# Patient Record
Sex: MALE | Race: ASIAN | NOT HISPANIC OR LATINO
[De-identification: names, ages, dates, MRNs, and addresses within clinical notes are randomized per-mention and may not be internally consistent; named-entity substitution may affect disease eponyms.]

---

## 2019-08-08 ENCOUNTER — APPOINTMENT (OUTPATIENT)
Dept: NEUROLOGY | Facility: CLINIC | Age: 54
End: 2019-08-08
Payer: COMMERCIAL

## 2019-08-08 VITALS
WEIGHT: 165 LBS | HEIGHT: 67.5 IN | HEART RATE: 71 BPM | BODY MASS INDEX: 25.59 KG/M2 | OXYGEN SATURATION: 98 % | SYSTOLIC BLOOD PRESSURE: 126 MMHG | DIASTOLIC BLOOD PRESSURE: 83 MMHG | TEMPERATURE: 98 F

## 2019-08-08 PROCEDURE — 99204 OFFICE O/P NEW MOD 45 MIN: CPT

## 2019-08-08 NOTE — CONSULT LETTER
[Dear  ___] : Dear  [unfilled], [Consult Letter:] : I had the pleasure of evaluating your patient, [unfilled]. [Please see my note below.] : Please see my note below. [Sincerely,] : Sincerely, [Consult Closing:] : Thank you very much for allowing me to participate in the care of this patient.  If you have any questions, please do not hesitate to contact me. [FreeTextEntry3] : Cortez Su M.D.\par Neurology, Electromyography and Neuromuscular Medicine\par [facility]\par \par  of Neurology\par [facility]

## 2019-08-08 NOTE — ASSESSMENT
[FreeTextEntry1] : Likely trigeminal neuralgia, less likely SUNCT\par Discussed various treatment options, currently wishes to defer\par If pain gets worse would try trileptal or gabapentin\par Imaging unremarkable (MRI brain from 2017 reviewed independently) although not clear if there is vascular loop contacting left trigeminal nerve - will get reports from [facility] \par Return as needed for new or worsening symptoms

## 2019-08-08 NOTE — PHYSICAL EXAM
[FreeTextEntry1] : Gen: appears well, well-nourished, no acute distress\par \par MS: awake, alert, speech fluent, comprehension intact, follows commands\par \par CN: PERRL, EOMI, visual fields full, facial strength and sensation intact and symmetric, palate elevation symmetric, tongue midline, no tongue atrophy or fasciculations\par \par Motor: normal bulk and tone, 5/5 strength throughout, no abnormal movements\par \par Sensory: light touch intact and symmetric throughout\par \par Reflexes: 2+ symmetric throughout, no Jama’s sign, plantar responses flexor bilaterally\par \par Coordination: no dysmetria on finger to nose, Romberg negative\par \par Gait: normal

## 2019-08-08 NOTE — HISTORY OF PRESENT ILLNESS
[FreeTextEntry1] : Referred by Dr. Quintanilla for left facial pain\par Symptoms started in 2004 when he had temporary crown put in, went away after permanent crown was placed. Then in 2016 it started happening again, described as a "shock-like" pain in the left side of the face, from the corner of the mouth up to behind the left eye, 10/10 in severity. Symptoms worse when cold or hot water hits that area of the face in the shower, also sometimes triggered by the first bite of food (especially if hard texture). There are periods when it bothers him every day for a few weeks, then gets better for a while, then comes back. \par He also feels his eyes watering when the pain occurs, left more than right. No nasal discharge. \par He was started on carbamazepine and baclofen, made him feel "loopy", but sure it they helped because symptoms had started to subside before he started them. \par \par 10 pt review of systems performed and reviewed with patient\par Past medical history, surgical history, social history, and family history reviewed with patient\par See scanned document for details

## 2019-09-06 ENCOUNTER — APPOINTMENT (OUTPATIENT)
Dept: PULMONOLOGY | Facility: CLINIC | Age: 54
End: 2019-09-06
Payer: COMMERCIAL

## 2019-09-06 VITALS
DIASTOLIC BLOOD PRESSURE: 70 MMHG | SYSTOLIC BLOOD PRESSURE: 110 MMHG | TEMPERATURE: 98.2 F | OXYGEN SATURATION: 96 % | HEART RATE: 88 BPM | HEIGHT: 67.5 IN | BODY MASS INDEX: 26.06 KG/M2 | WEIGHT: 168 LBS

## 2019-09-06 DIAGNOSIS — Z82.5 FAMILY HISTORY OF ASTHMA AND OTHER CHRONIC LOWER RESPIRATORY DISEASES: ICD-10-CM

## 2019-09-06 DIAGNOSIS — Z82.49 FAMILY HISTORY OF ISCHEMIC HEART DISEASE AND OTHER DISEASES OF THE CIRCULATORY SYSTEM: ICD-10-CM

## 2019-09-06 PROCEDURE — 94727 GAS DIL/WSHOT DETER LNG VOL: CPT

## 2019-09-06 PROCEDURE — 94729 DIFFUSING CAPACITY: CPT

## 2019-09-06 PROCEDURE — 99204 OFFICE O/P NEW MOD 45 MIN: CPT | Mod: 25

## 2019-09-06 PROCEDURE — 95012 NITRIC OXIDE EXP GAS DETER: CPT

## 2019-09-06 PROCEDURE — 94060 EVALUATION OF WHEEZING: CPT

## 2019-09-06 NOTE — ASSESSMENT
[FreeTextEntry1] : Data reviewed:\par \par PA/lat CXR [facility] 7/2019 personally reviewed: read as clear, to me inc markings\par \par PFT 09/06/2019 : mild-mod restriction with no sig BD response, normal DLCO / FENO 38\par \par Impression:\par Asthma by history\par \par Plan:\par His evaluation today is not diagnostic of asthma, on Advair 500/50. The history is suggestive of asthma, with a lifelong history and improvement on consistent Advair. I will leave him on Advair 500/50 for another couple of months and then re-evaluate him for stepdown.\par He had lots of questions. I counseled him about diet (plant-based) and exercise (do it).\par He is also very concerned about his cardiac health given family history of early CAD in father and brother. I recommended that he see a cardiologist for his own risk assessment.

## 2019-09-06 NOTE — HISTORY OF PRESENT ILLNESS
[FreeTextEntry1] : 09/06/2019: Asked to evaluate patient by Dr Quintanilla for asthma. He was born with asthma. He wakes every day with a drowning sensation and requires rescue inhaler first thing in the morning and about 6 hours later. He had been using Advair just sporadically, but Dr Quintanilla advised him to use it more regularly. Now he's on Advair 500 bid faithfully for about a month. On this he breathes much better and hasn't needed his rescue inhaler in 3 days. One unscheduled visit in past couple of years. No steroids in past year. Does have allergies. 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 [facility] but [facility] since infancy.\par

## 2019-09-06 NOTE — CONSULT LETTER
[Dear  ___] : Dear  [unfilled], [( Thank you for referring [unfilled] for consultation for _____ )] : Thank you for referring [unfilled] for consultation for [unfilled] [Please see my note below.] : Please see my note below. [Consult Closing:] : Thank you very much for allowing me to participate in the care of this patient.  If you have any questions, please do not hesitate to contact me. [Sincerely,] : Sincerely, [FreeTextEntry2] : Leroy Quintanilla, DO\par [address] \[place] [address] [FreeTextEntry3] : Fela Martinez MD, FCCP\par

## 2019-09-06 NOTE — PHYSICAL EXAM
[General Appearance - Well Nourished] : well nourished [General Appearance - Well Developed] : well developed [Normal Conjunctiva] : the conjunctiva exhibited no abnormalities [General Appearance - In No Acute Distress] : no acute distress [FreeTextEntry1] : no LAD [Normal Oropharynx] : normal oropharynx [Murmurs] : no murmurs present [Heart Rate And Rhythm] : heart rate and rhythm were normal [Heart Sounds] : normal S1 and S2 [Edema] : no peripheral edema present [Auscultation Breath Sounds / Voice Sounds] : lungs were clear to auscultation bilaterally [Abdomen Soft] : soft [Bowel Sounds] : normal bowel sounds [Nail Clubbing] : no clubbing of the fingernails [Abdomen Tenderness] : non-tender [Cyanosis, Localized] : no localized cyanosis [] : no rash [Affect] : the affect was normal

## 2019-11-06 ENCOUNTER — APPOINTMENT (OUTPATIENT)
Dept: PULMONOLOGY | Facility: CLINIC | Age: 54
End: 2019-11-06
Payer: COMMERCIAL

## 2019-11-06 VITALS
BODY MASS INDEX: 26.22 KG/M2 | HEART RATE: 67 BPM | HEIGHT: 67.5 IN | OXYGEN SATURATION: 98 % | DIASTOLIC BLOOD PRESSURE: 80 MMHG | TEMPERATURE: 97.5 F | WEIGHT: 169 LBS | SYSTOLIC BLOOD PRESSURE: 104 MMHG

## 2019-11-06 PROCEDURE — 94010 BREATHING CAPACITY TEST: CPT

## 2019-11-06 PROCEDURE — G0008: CPT

## 2019-11-06 PROCEDURE — 99213 OFFICE O/P EST LOW 20 MIN: CPT | Mod: 25

## 2019-11-06 PROCEDURE — 90686 IIV4 VACC NO PRSV 0.5 ML IM: CPT

## 2019-11-06 NOTE — HISTORY OF PRESENT ILLNESS
[FreeTextEntry1] : 09/06/2019: Asked to evaluate patient by Dr Quintanilla for asthma. He was born with asthma. He wakes every day with a drowning sensation and requires rescue inhaler first thing in the morning and about 6 hours later. He had been using Advair just sporadically, but Dr Quintanilla advised him to use it more regularly. Now he's on Advair 500 bid faithfully for about a month. On this he breathes much better and hasn't needed his rescue inhaler in 3 days. One unscheduled visit in past couple of years. No steroids in past year. Does have allergies. 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 Born [facility] but [facility] since infancy.\par \par 11/6/19: Dramatically improved on regular Advair, but when he misses a dose (rare) he does get that drowning feeling. He can feel when it's wearing off. From August to now only 1-2 uses of albuterol and even in these instances, they are less severe. Cold air triggers. No exacerbations.\par

## 2019-11-06 NOTE — ASSESSMENT
[FreeTextEntry1] : Data reviewed:\par \par PA/lat CXR [facility] 7/2019: read as clear, to me inc markings\par \par PFT 09/06/2019 : mild-mod restriction with no sig BD response, normal DLCO / FENO 38\par [place] 11/6/19: restricted, FEV1 65%\par \par Impression:\par Asthma by history\par \par Plan:\par Trial of stepdown to Advair 250/50. \par Should call and let me know if he notices any difference.\par Flu vaccine given.\par [id number] next visit.

## 2019-11-06 NOTE — CONSULT LETTER
[Dear  ___] : Dear  [unfilled], [Courtesy Letter:] : I had the pleasure of seeing your patient, [unfilled], in my office today. [Please see my note below.] : Please see my note below. [Consult Closing:] : Thank you very much for allowing me to participate in the care of this patient.  If you have any questions, please do not hesitate to contact me. [Sincerely,] : Sincerely, [FreeTextEntry2] : Leroy Quintanilla, DO\par [address] \[place] [address] [FreeTextEntry3] : Fela Martinez MD, FCCP\par

## 2020-01-08 ENCOUNTER — APPOINTMENT (OUTPATIENT)
Dept: PULMONOLOGY | Facility: CLINIC | Age: 55
End: 2020-01-08
Payer: COMMERCIAL

## 2020-01-08 VITALS
HEIGHT: 67 IN | DIASTOLIC BLOOD PRESSURE: 74 MMHG | SYSTOLIC BLOOD PRESSURE: 104 MMHG | TEMPERATURE: 97.9 F | HEART RATE: 81 BPM | WEIGHT: 170 LBS | OXYGEN SATURATION: 98 % | BODY MASS INDEX: 26.68 KG/M2

## 2020-01-08 PROCEDURE — 99213 OFFICE O/P EST LOW 20 MIN: CPT | Mod: 25

## 2020-01-08 PROCEDURE — 94010 BREATHING CAPACITY TEST: CPT

## 2020-01-08 RX ORDER — FLUTICASONE PROPIONATE AND SALMETEROL 50; 500 UG/1; UG/1
500-50 POWDER RESPIRATORY (INHALATION)
Refills: 0 | Status: DISCONTINUED | COMMUNITY
End: 2020-01-08

## 2020-01-08 NOTE — HISTORY OF PRESENT ILLNESS
[FreeTextEntry1] : 09/06/2019: Asked to evaluate patient by Dr Quintanilla for asthma. He was born with asthma. He wakes every day with a drowning sensation and requires rescue inhaler first thing in the morning and about 6 hours later. He had been using Advair just sporadically, but Dr Quintanilla advised him to use it more regularly. Now he's on Advair 500 bid faithfully for about a month. On this he breathes much better and hasn't needed his rescue inhaler in 3 days. One unscheduled visit in past couple of years. No steroids in past year. Does have allergies. 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 Born [facility] but [facility] since infancy.\par \par 11/6/19: Dramatically improved on regular Advair, but when he misses a dose (rare) he does get that drowning feeling. He can feel when it's wearing off. From August to now only 1-2 uses of albuterol and even in these instances, they are less severe. Cold air triggers. No exacerbations.\par \par 1/8/20: Has done well on Advair 250/50 - no change in his symptoms. He has had a recent cold with rhinorrhea, little sore throat, chest congestion and sputum production, better today. Until this happened he did not need any albuterol, but in the setting of this cold he did need it.\par

## 2020-01-08 NOTE — PHYSICAL EXAM
[General Appearance - Well Developed] : well developed [General Appearance - Well Nourished] : well nourished [General Appearance - In No Acute Distress] : no acute distress [Heart Rate And Rhythm] : heart rate and rhythm were normal [Normal Oropharynx] : normal oropharynx [Heart Sounds] : normal S1 and S2 [Murmurs] : no murmurs present [Auscultation Breath Sounds / Voice Sounds] : lungs were clear to auscultation bilaterally

## 2020-01-08 NOTE — ASSESSMENT
[FreeTextEntry1] : Data reviewed:\par \par PA/lat CXR L[facility] 7/2019: read as clear, to me inc markings\par \par PFT 09/06/2019 : mild-mod restriction with no sig BD response, normal DLCO / FENO 38\par [place] 11/6/19: restricted, FEV1 65%\par [place] 1/8/20: restricted, FEV1 63%\par \par Impression:\par Asthma by history\par \par Plan:\par Cont Advair 250/50. \par Can see me in spring for consideration of further stepdown, but maintain for winter.\par Declined pneumovax today.

## 2020-07-13 ENCOUNTER — APPOINTMENT (OUTPATIENT)
Dept: NEUROLOGY | Facility: CLINIC | Age: 55
End: 2020-07-13
Payer: COMMERCIAL

## 2020-07-13 VITALS
BODY MASS INDEX: 27.15 KG/M2 | OXYGEN SATURATION: 98 % | HEART RATE: 78 BPM | SYSTOLIC BLOOD PRESSURE: 128 MMHG | HEIGHT: 67 IN | WEIGHT: 173 LBS | DIASTOLIC BLOOD PRESSURE: 83 MMHG | TEMPERATURE: 96.26 F

## 2020-07-13 PROCEDURE — 99214 OFFICE O/P EST MOD 30 MIN: CPT

## 2020-07-13 NOTE — PHYSICAL EXAM
[FreeTextEntry1] : Gen: appears well, well-nourished, no acute distress\par \par MS: awake, alert, speech fluent, comprehension intact, follows commands\par \par CN: PERRL, EOMI, visual fields full, facial strength and sensation intact and symmetric, palate elevation symmetric, tongue midline, no tongue atrophy or fasciculations\par \par Motor: normal bulk and tone, 5/5 strength throughout, no abnormal movements\par \par Sensory: light touch intact and symmetric throughout\par \par Coordination: no dysmetria on finger to nose\par \par Gait: normal

## 2020-07-13 NOTE — HISTORY OF PRESENT ILLNESS
[FreeTextEntry1] : Last seen August 2019\par At that time he was doing well, not much pain\par However in the past few weeks he has had another attack of severe left facial pain, worse with chewing / eating, cold or hot liquids\par He saw an endodontist and oral surgeon who think there might be a wisdom / molar tooth potentially irritating a nerve but they are not sure if surgically removing it

## 2020-07-13 NOTE — ASSESSMENT
[FreeTextEntry1] : Restart carbamazepine 100mg ER BID, may increase to 200mg BID after 1-2 weeks if ineffective\par Will obtain report and upload images of prior MRI brain in 2017 to see if there is a vascular loop that is present in majority of people with trigeminal neuralgia\par If no vascular loop and no improvement with meds consider removal of molar tooth at that time\par

## 2020-07-21 ENCOUNTER — TRANSCRIPTION ENCOUNTER (OUTPATIENT)
Age: 55
End: 2020-07-21

## 2020-08-17 ENCOUNTER — APPOINTMENT (OUTPATIENT)
Dept: NEUROLOGY | Facility: CLINIC | Age: 55
End: 2020-08-17

## 2020-08-18 ENCOUNTER — APPOINTMENT (OUTPATIENT)
Dept: NEUROLOGY | Facility: CLINIC | Age: 55
End: 2020-08-18
Payer: COMMERCIAL

## 2020-08-18 VITALS
HEIGHT: 67 IN | WEIGHT: 176 LBS | OXYGEN SATURATION: 99 % | BODY MASS INDEX: 27.62 KG/M2 | TEMPERATURE: 208.04 F | DIASTOLIC BLOOD PRESSURE: 76 MMHG | HEART RATE: 83 BPM | SYSTOLIC BLOOD PRESSURE: 130 MMHG

## 2020-08-18 DIAGNOSIS — Z00.00 ENCOUNTER FOR GENERAL ADULT MEDICAL EXAMINATION W/OUT ABNORMAL FINDINGS: ICD-10-CM

## 2020-08-18 PROCEDURE — 99214 OFFICE O/P EST MOD 30 MIN: CPT

## 2020-08-24 ENCOUNTER — TRANSCRIPTION ENCOUNTER (OUTPATIENT)
Age: 55
End: 2020-08-24

## 2020-08-24 PROBLEM — Z00.00 ENCOUNTER FOR PREVENTIVE HEALTH EXAMINATION: Status: ACTIVE | Noted: 2019-07-09

## 2020-08-24 LAB
ALBUMIN SERPL ELPH-MCNC: 5 G/DL
ALP BLD-CCNC: 76 U/L
ALT SERPL-CCNC: 25 U/L
ANION GAP SERPL CALC-SCNC: 12 MMOL/L
AST SERPL-CCNC: 19 U/L
BASOPHILS # BLD AUTO: 0.04 K/UL
BASOPHILS NFR BLD AUTO: 0.5 %
BILIRUB SERPL-MCNC: 0.2 MG/DL
BUN SERPL-MCNC: 13 MG/DL
CALCIUM SERPL-MCNC: 9.5 MG/DL
CHLORIDE SERPL-SCNC: 103 MMOL/L
CO2 SERPL-SCNC: 26 MMOL/L
CREAT SERPL-MCNC: 0.85 MG/DL
EOSINOPHIL # BLD AUTO: 0.35 K/UL
EOSINOPHIL NFR BLD AUTO: 4.5 %
GLUCOSE SERPL-MCNC: 102 MG/DL
HCT VFR BLD CALC: 45.9 %
HGB BLD-MCNC: 14.2 G/DL
IMM GRANULOCYTES NFR BLD AUTO: 0.4 %
LYMPHOCYTES # BLD AUTO: 3.06 K/UL
LYMPHOCYTES NFR BLD AUTO: 39.4 %
MAN DIFF?: NORMAL
MCHC RBC-ENTMCNC: 29 PG
MCHC RBC-ENTMCNC: 30.9 GM/DL
MCV RBC AUTO: 93.9 FL
MONOCYTES # BLD AUTO: 0.6 K/UL
MONOCYTES NFR BLD AUTO: 7.7 %
NEUTROPHILS # BLD AUTO: 3.68 K/UL
NEUTROPHILS NFR BLD AUTO: 47.5 %
PLATELET # BLD AUTO: 200 K/UL
POTASSIUM SERPL-SCNC: 4.3 MMOL/L
PROT SERPL-MCNC: 7.3 G/DL
RBC # BLD: 4.89 M/UL
RBC # FLD: 13 %
SODIUM SERPL-SCNC: 140 MMOL/L
WBC # FLD AUTO: 7.76 K/UL

## 2020-08-24 NOTE — ASSESSMENT
[FreeTextEntry1] : Symptoms have subsided with carbamazepine, however he is experiencing adverse effects\par Will try oxcarbazepine, check CBC and CMP today\par if ineffective go back to carbamazepine and side effects should become more tolerable

## 2020-08-24 NOTE — HISTORY OF PRESENT ILLNESS
[FreeTextEntry1] : Pain has improved after starting carbamazepine, but is having side effects of nausea, stomach cramps, dizziness, fatigue\par He went down to 100mg once daily and pain seems to still be under control \par Reviewed MRI independently - no definite vascular loop, but no report available

## 2020-08-27 ENCOUNTER — TRANSCRIPTION ENCOUNTER (OUTPATIENT)
Age: 55
End: 2020-08-27

## 2020-09-10 ENCOUNTER — TRANSCRIPTION ENCOUNTER (OUTPATIENT)
Age: 55
End: 2020-09-10

## 2020-09-29 ENCOUNTER — LABORATORY RESULT (OUTPATIENT)
Age: 55
End: 2020-09-29

## 2020-10-02 ENCOUNTER — APPOINTMENT (OUTPATIENT)
Dept: PULMONOLOGY | Facility: CLINIC | Age: 55
End: 2020-10-02
Payer: COMMERCIAL

## 2020-10-02 VITALS
HEIGHT: 67 IN | WEIGHT: 170 LBS | BODY MASS INDEX: 26.68 KG/M2 | SYSTOLIC BLOOD PRESSURE: 104 MMHG | OXYGEN SATURATION: 98 % | TEMPERATURE: 208.04 F | DIASTOLIC BLOOD PRESSURE: 80 MMHG | HEART RATE: 75 BPM

## 2020-10-02 DIAGNOSIS — Z23 ENCOUNTER FOR IMMUNIZATION: ICD-10-CM

## 2020-10-02 PROCEDURE — G0008: CPT

## 2020-10-02 PROCEDURE — 99213 OFFICE O/P EST LOW 20 MIN: CPT | Mod: 25

## 2020-10-02 PROCEDURE — 90686 IIV4 VACC NO PRSV 0.5 ML IM: CPT

## 2020-10-02 PROCEDURE — 94010 BREATHING CAPACITY TEST: CPT

## 2020-10-08 ENCOUNTER — TRANSCRIPTION ENCOUNTER (OUTPATIENT)
Age: 55
End: 2020-10-08

## 2020-10-12 ENCOUNTER — TRANSCRIPTION ENCOUNTER (OUTPATIENT)
Age: 55
End: 2020-10-12

## 2020-10-28 ENCOUNTER — TRANSCRIPTION ENCOUNTER (OUTPATIENT)
Age: 55
End: 2020-10-28

## 2020-10-30 ENCOUNTER — TRANSCRIPTION ENCOUNTER (OUTPATIENT)
Age: 55
End: 2020-10-30

## 2020-11-02 ENCOUNTER — APPOINTMENT (OUTPATIENT)
Dept: CT IMAGING | Facility: HOSPITAL | Age: 55
End: 2020-11-02
Payer: COMMERCIAL

## 2020-11-02 ENCOUNTER — OUTPATIENT (OUTPATIENT)
Dept: OUTPATIENT SERVICES | Facility: HOSPITAL | Age: 55
LOS: 1 days | End: 2020-11-02
Payer: COMMERCIAL

## 2020-11-02 PROCEDURE — 71250 CT THORAX DX C-: CPT | Mod: 26

## 2020-11-02 PROCEDURE — 71250 CT THORAX DX C-: CPT

## 2020-11-04 NOTE — HISTORY OF PRESENT ILLNESS
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 Producing thick sputum since March till now. Now mostly first thing in the morning. Difficult to expectorate. Saw his primary who inc his Advair to 500/50 and added montelukast which seems to be helping. Has the same gasping/drowning feeling, but less intense. He didn't like the higher dose Advair so he resumed the 250/50. But he is still needing rescue 3-4x a day.

## 2020-11-04 NOTE — ASSESSMENT
[FreeTextEntry1] : Data reviewed:\par \par PA/lat CXR R 7/2019: read as clear, to me inc markings\par \par PFT 09/06/2019 : mild-mod restriction with no sig BD response, normal DLCO / FENO 38\par [place] 11/6/19: restricted, FEV1 65%\par [place] 1/8/20: restricted, FEV1 63%\par [place] 10/2/20: restricted, FEV1 58%\par \par Impression:\par Asthma by history\par \par Plan:\par Cont Advair 250/50 and montelukast.\par Given his ongoing symptoms w prominent productive cough despite good asthma tx, and given the PFT which shows restriction, will get a CT to exclude alternative dx, ie bronchiectasis.\par Flu vaccine given. Eligible for pneumovax, has previously declined.\par Return in winter.\par --\par CT chest [facility] 11/2/20 personally reviewed : mild airways disease, no sig bronchiectasis

## 2020-11-13 ENCOUNTER — TRANSCRIPTION ENCOUNTER (OUTPATIENT)
Age: 55
End: 2020-11-13

## 2020-11-16 ENCOUNTER — TRANSCRIPTION ENCOUNTER (OUTPATIENT)
Age: 55
End: 2020-11-16

## 2020-11-18 ENCOUNTER — TRANSCRIPTION ENCOUNTER (OUTPATIENT)
Age: 55
End: 2020-11-18

## 2020-11-24 ENCOUNTER — TRANSCRIPTION ENCOUNTER (OUTPATIENT)
Age: 55
End: 2020-11-24

## 2020-11-25 ENCOUNTER — TRANSCRIPTION ENCOUNTER (OUTPATIENT)
Age: 55
End: 2020-11-25

## 2020-12-14 ENCOUNTER — EMERGENCY (EMERGENCY)
Facility: HOSPITAL | Age: 55
LOS: 1 days | Discharge: HOME | End: 2020-12-14
Admitting: EMERGENCY MEDICINE
Payer: COMMERCIAL

## 2020-12-14 VITALS
DIASTOLIC BLOOD PRESSURE: 85 MMHG | TEMPERATURE: 98 F | SYSTOLIC BLOOD PRESSURE: 138 MMHG | HEART RATE: 67 BPM | RESPIRATION RATE: 16 BRPM | OXYGEN SATURATION: 98 %

## 2020-12-14 DIAGNOSIS — Z20.828 CONTACT WITH AND (SUSPECTED) EXPOSURE TO OTHER VIRAL COMMUNICABLE DISEASES: ICD-10-CM

## 2020-12-14 LAB — SARS-COV-2 RNA SPEC QL NAA+PROBE: SIGNIFICANT CHANGE UP

## 2020-12-14 PROCEDURE — 99283 EMERGENCY DEPT VISIT LOW MDM: CPT

## 2020-12-14 PROCEDURE — U0003: CPT

## 2020-12-14 NOTE — ED PROVIDER NOTE - PATIENT PORTAL LINK FT
You can access the FollowMyHealth Patient Portal offered by [facility] by registering at the following website: [email or web address]. By joining [email or web address]’s FollowMyHealth portal, you will also be able to view your health information using other applications (apps) compatible with our system.

## 2020-12-18 ENCOUNTER — APPOINTMENT (OUTPATIENT)
Dept: PULMONOLOGY | Facility: CLINIC | Age: 55
End: 2020-12-18
Payer: COMMERCIAL

## 2020-12-18 ENCOUNTER — LABORATORY RESULT (OUTPATIENT)
Age: 55
End: 2020-12-18

## 2020-12-18 VITALS
TEMPERATURE: 209.66 F | HEIGHT: 67 IN | HEART RATE: 86 BPM | OXYGEN SATURATION: 98 % | SYSTOLIC BLOOD PRESSURE: 122 MMHG | DIASTOLIC BLOOD PRESSURE: 84 MMHG | BODY MASS INDEX: 26.68 KG/M2 | WEIGHT: 170 LBS

## 2020-12-18 PROCEDURE — 99213 OFFICE O/P EST LOW 20 MIN: CPT | Mod: 25

## 2020-12-18 PROCEDURE — 99072 ADDL SUPL MATRL&STAF TM PHE: CPT

## 2020-12-18 PROCEDURE — 94010 BREATHING CAPACITY TEST: CPT

## 2020-12-18 NOTE — PHYSICAL EXAM
[No Acute Distress] : no acute distress [Normal Rate/Rhythm] : normal rate/rhythm [Normal S1, S2] : normal s1, s2 [No Murmurs] : no murmurs [TextBox_68] : coarse breath sounds

## 2020-12-18 NOTE — ASSESSMENT
[FreeTextEntry1] : Data reviewed:\par \par CT chest [facility] 11/2/20 personally reviewed : mild airways disease, no sig bronchiectasis\par \par PFT 09/06/2019 : mild-mod restriction with no sig BD response, normal DLCO / FENO 38\par [place] 11/6/19: restricted, FEV1 65%\par [place] 1/8/20: restricted, FEV1 63%\par [place] 10/2/20: restricted, FEV1 58%\par [place] 12/18/20: restricted, FEV1 64%\par \par Impression:\par Asthma by history\par \par Plan:\par Cont Advair 500/50 and montelukast.\par Will check labs and sputum.\par Alternatives could include adding LAMA, adding tiwk azithro, or biologics.\par For now we will sit tight.\par I will have him see cardiology to exclude any cardiac contribution to his symptoms as well as for preventative evaluation given his family hx.\par Encouraged to have Covid vaccine when available to him.

## 2020-12-18 NOTE — HISTORY OF PRESENT ILLNESS
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 Producing thick sputum since March till now. Now mostly first thing in the morning. Difficult to expectorate. Saw his primary who inc his Advair to 500/50 and added montelukast which seems to be helping. Has the same gasping/drowning feeling, but less intense. He didn't like the higher dose Advair so he resumed the 250/50. But he is still needing rescue 3-4x a day.\par \par 12/18/20: Continues to complain of chronic productive cough. On 12/4 his primary treated him w prednisone and azithro which made him feel great. So good he forgot to take his Advair the past 2 days. He put himself back on Advair 500/50. He always describes this as "drowning" but I think this just means dyspnea. He's on an increasing dose of carbamazepine for trigeminal neuralgia. On the higher dose he complains of chest pain. He does note dyspnea when he lies down at night. He does have edema.

## 2020-12-19 PROBLEM — Z78.9 OTHER SPECIFIED HEALTH STATUS: Chronic | Status: ACTIVE | Noted: 2020-12-14

## 2020-12-22 LAB
BASOPHILS # BLD AUTO: 0.05 K/UL
BASOPHILS NFR BLD AUTO: 0.7 %
EOSINOPHIL # BLD AUTO: 0.33 K/UL
EOSINOPHIL NFR BLD AUTO: 4.4 %
HCT VFR BLD CALC: 46.8 %
HGB BLD-MCNC: 14.5 G/DL
IMM GRANULOCYTES NFR BLD AUTO: 0.4 %
LYMPHOCYTES # BLD AUTO: 2.79 K/UL
LYMPHOCYTES NFR BLD AUTO: 36.8 %
MAN DIFF?: NORMAL
MCHC RBC-ENTMCNC: 29.7 PG
MCHC RBC-ENTMCNC: 31 GM/DL
MCV RBC AUTO: 95.9 FL
MONOCYTES # BLD AUTO: 0.61 K/UL
MONOCYTES NFR BLD AUTO: 8 %
NEUTROPHILS # BLD AUTO: 3.77 K/UL
NEUTROPHILS NFR BLD AUTO: 49.7 %
PLATELET # BLD AUTO: 195 K/UL
RBC # BLD: 4.88 M/UL
RBC # FLD: 13.3 %
WBC # FLD AUTO: 7.58 K/UL

## 2020-12-23 LAB
A ALTERNATA IGE QN: <0.1 KUA/L
A FUMIGATUS IGE QN: <0.1 KUA/L
C ALBICANS IGE QN: <0.1 KUA/L
C HERBARUM IGE QN: <0.1 KUA/L
CAT DANDER IGE QN: 0.85 KUA/L
COMMON RAGWEED IGE QN: 0.12 KUA/L
D FARINAE IGE QN: 10.3 KUA/L
D PTERONYSS IGE QN: 6.9 KUA/L
DEPRECATED A ALTERNATA IGE RAST QL: 0
DEPRECATED A FUMIGATUS IGE RAST QL: 0
DEPRECATED C ALBICANS IGE RAST QL: 0
DEPRECATED C HERBARUM IGE RAST QL: 0
DEPRECATED CAT DANDER IGE RAST QL: 2
DEPRECATED COMMON RAGWEED IGE RAST QL: NORMAL
DEPRECATED D FARINAE IGE RAST QL: 3
DEPRECATED D PTERONYSS IGE RAST QL: 3
DEPRECATED DOG DANDER IGE RAST QL: 2
DEPRECATED M RACEMOSUS IGE RAST QL: 0
DEPRECATED ROACH IGE RAST QL: 2
DEPRECATED TIMOTHY IGE RAST QL: 0
DEPRECATED WHITE OAK IGE RAST QL: NORMAL
DOG DANDER IGE QN: 0.86 KUA/L
M RACEMOSUS IGE QN: <0.1 KUA/L
ROACH IGE QN: 3.14 KUA/L
TIMOTHY IGE QN: <0.1 KUA/L
TOTAL IGE SMQN RAST: 195 KU/L
WHITE OAK IGE QN: 0.1 KUA/L

## 2021-01-12 ENCOUNTER — APPOINTMENT (OUTPATIENT)
Dept: NEUROLOGY | Facility: CLINIC | Age: 56
End: 2021-01-12
Payer: COMMERCIAL

## 2021-01-12 PROCEDURE — 99213 OFFICE O/P EST LOW 20 MIN: CPT | Mod: 95

## 2021-01-12 NOTE — ASSESSMENT
[FreeTextEntry1] : Can increase oxcarbazepine to 600mg TID but that would be max dose - after that next step would be surgery - either wisdom tooth extraction or repeat MRI brain for vascular loop which may be decompressed (prior MRI without evidence of vascular loop)\par Agree with re-evalaution by Dr. Powell facial pain specialist

## 2021-01-12 NOTE — REASON FOR VISIT
[Follow-Up: _____] : a [unfilled] follow-up visit [Home] : at home, [unfilled] , at the time of the visit. [Medical Office: ([facility])___] : at the medical office located in  [Verbal consent obtained from patient] : the patient, [unfilled]

## 2021-01-12 NOTE — HISTORY OF PRESENT ILLNESS
[FreeTextEntry1] : Facial pain has been getting worse recently - after holidays \par He saw his dentist who did not find anything wrong with his teeth - xrays were ok too\par He had seen an oral surgeon last summer who saw impacted molars / wisdom teeth\par He's been taking oxcarbazepine 600mg BID

## 2021-01-20 LAB — FUNGUS SPT CULT: NORMAL

## 2021-01-25 ENCOUNTER — APPOINTMENT (OUTPATIENT)
Dept: HEART AND VASCULAR | Facility: CLINIC | Age: 56
End: 2021-01-25

## 2021-02-08 LAB — ACID FAST STN SPT: NORMAL

## 2021-02-18 ENCOUNTER — APPOINTMENT (OUTPATIENT)
Dept: PULMONOLOGY | Facility: CLINIC | Age: 56
End: 2021-02-18

## 2021-03-08 ENCOUNTER — TRANSCRIPTION ENCOUNTER (OUTPATIENT)
Age: 56
End: 2021-03-08

## 2021-03-08 ENCOUNTER — LABORATORY RESULT (OUTPATIENT)
Age: 56
End: 2021-03-08

## 2021-03-09 ENCOUNTER — TRANSCRIPTION ENCOUNTER (OUTPATIENT)
Age: 56
End: 2021-03-09

## 2021-03-10 ENCOUNTER — APPOINTMENT (OUTPATIENT)
Dept: PULMONOLOGY | Facility: CLINIC | Age: 56
End: 2021-03-10
Payer: COMMERCIAL

## 2021-03-10 VITALS
BODY MASS INDEX: 26.37 KG/M2 | HEART RATE: 74 BPM | OXYGEN SATURATION: 98 % | HEIGHT: 67 IN | DIASTOLIC BLOOD PRESSURE: 70 MMHG | WEIGHT: 168 LBS | SYSTOLIC BLOOD PRESSURE: 120 MMHG | TEMPERATURE: 207.5 F

## 2021-03-10 PROCEDURE — 99072 ADDL SUPL MATRL&STAF TM PHE: CPT

## 2021-03-10 PROCEDURE — 99213 OFFICE O/P EST LOW 20 MIN: CPT | Mod: GC

## 2021-03-10 NOTE — ASSESSMENT
[FreeTextEntry1] : Data reviewed:\par \par Labs 12/2020: eos 330 / IgE 195 w pos RAST\par \par CT chest [place] 11/2/20 personally reviewed : mild airways disease, no sig bronchiectasis\par \par PFT 09/06/2019 : mild-mod restriction with no sig BD response, normal DLCO / FENO 38\par [place] 11/6/19: restricted, FEV1 65%\par [place] 1/8/20: restricted, FEV1 63%\par [place] 10/2/20: restricted, FEV1 58%\par [place] 12/18/20: restricted, FEV1 64%\par \par Impression:\par Asthma by history\par \par Plan:\par - Patient with persistent symptoms despite medication compliance with high dose advair and montelukast. He had good response to steroids with good control of his symptoms which may demonstrate airways inflammation leading to his symptoms. Discussed with patient referral to allergy medications versus continued care versus Xolair administration. \par - Will observe clinically given overall his symptoms are stable to improved and patient will return to clinic if worsening\par - encouraged to obtain covid vaccine. \par --\par Attending Addendum\par \par Seen and examined by me and agree w above. Rates himself 8.5/10 on Advair only, currently has stopped montelukast. Did experience symptom relief on recent course of steroid for dental procedure. He's persistently symptomatic without daniella exacerbations on high dose ICS/LABA. Options would be cont current mgmt, allergy eval, or try Xolair. He generally improves at this time of year. Will cont current mgmt for now. Made him Covid appt at [facility] for May 13.

## 2021-03-10 NOTE — HISTORY OF PRESENT ILLNESS
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 Producing thick sputum since March till now. Now mostly first thing in the morning. Difficult to expectorate. Saw his primary who inc his Advair to 500/50 and added montelukast which seems to be helping. Has the same gasping/drowning feeling, but less intense. He didn't like the higher dose Advair so he resumed the 250/50. But he is still needing rescue 3-4x a day.\par \par 12/18/20: Continues to complain of chronic productive cough. On 12/4 his primary treated him w prednisone and azithro which made him feel great. So good he forgot to take his Advair the past 2 days. He put himself back on Advair 500/50. He always describes this as "drowning" but I think this just means dyspnea. He's on an increasing dose of carbamazepine for trigeminal neuralgia. On the higher dose he complains of chest pain. He does note dyspnea when he lies down at night. He does have edema. \par \par 3/10/21 [place]: Patient reports that he is similar in his symptoms compared to his previous visit. He feels that he has to use his rescue inhaler 3-6 times per day. He feels relief in his dyspnea 2-3 minutes after taking his albuterol and the relief lasts for 2-3 hours. He is compliant with his advair, montelukast. He had a recent dental procedure and took prednisolone following and subsequently felt significantly improved his asthma symptoms with less frequent use of albuterol. he discontinued his montelukast during this time and his symptoms remained controlled. he went to his cardiologist who found no significant issues from a cardiac perspective.

## 2021-03-16 ENCOUNTER — TRANSCRIPTION ENCOUNTER (OUTPATIENT)
Age: 56
End: 2021-03-16

## 2021-04-05 ENCOUNTER — EMERGENCY (EMERGENCY)
Facility: HOSPITAL | Age: 56
LOS: 0 days | Discharge: HOME | End: 2021-04-05
Attending: EMERGENCY MEDICINE | Admitting: EMERGENCY MEDICINE
Payer: COMMERCIAL

## 2021-04-05 VITALS
DIASTOLIC BLOOD PRESSURE: 83 MMHG | SYSTOLIC BLOOD PRESSURE: 143 MMHG | HEIGHT: 67 IN | OXYGEN SATURATION: 100 % | TEMPERATURE: 98 F | RESPIRATION RATE: 18 BRPM | HEART RATE: 62 BPM | WEIGHT: 166.01 LBS

## 2021-04-05 DIAGNOSIS — R51.9 HEADACHE, UNSPECIFIED: ICD-10-CM

## 2021-04-05 DIAGNOSIS — G50.0 TRIGEMINAL NEURALGIA: ICD-10-CM

## 2021-04-05 PROCEDURE — 99284 EMERGENCY DEPT VISIT MOD MDM: CPT

## 2021-04-05 RX ORDER — KETOROLAC TROMETHAMINE 30 MG/ML
60 SYRINGE (ML) INJECTION ONCE
Refills: 0 | Status: DISCONTINUED | OUTPATIENT
Start: 2021-04-05 | End: 2021-04-05

## 2021-04-05 RX ADMIN — Medication 60 MILLIGRAM(S): at 23:07

## 2021-04-05 NOTE — ED ADULT NURSE NOTE - OBJECTIVE STATEMENT
Pt. complains of pain to left side of face. As per pt. pain originally began 2004, and has occurred on and off periodically. Pt. states that most recent flare up happened 8/2020, and pain has been persistent ever since. As per pt. he has seen both neurology and dental. And has been taking Oxycarbamezapine 600mg with no relieve.

## 2021-04-05 NOTE — ED ADULT NURSE NOTE - CHIEF COMPLAINT QUOTE
57 yo M BIBA for left sided facial pain had similar symptoms 1 year ago, was seen by dental and neurology to rule out trigeminal neuralgia. SLAMs 0 BEFAST negative.

## 2021-04-05 NOTE — ED PROVIDER NOTE - ATTENDING CONTRIBUTION TO CARE
56M pmh L trigeminal neuralgia p/w worsening L facial pain x few days. Pt has had chronic L facial pain x many months, being followed by multiple specialists in [place] incl Neurologist, Pain Mgmt, OMFS, and TMJ specialist. Has been taking oxycarbazepine since Aug, with incr doses, currently 600 mg po BID w/o relief. Was advised to have LL molar extracted 2 weeks ago, did not help with pain. Pain triggered today by eating, described as quick sharp stabbing pains across L face. Has next f/u appt with TMJ specialist this Thu 4/8/21. Denies ha, vision changes, dental pain, cp/sob, nv, focal numbness or weakness.    PE:  nad  skin warm, dry  ncat  perrl/eomi  eac/tms clear, no facial swelling or ttp, no trismus, op clear, LL molar socket healing well no signs of inf, good bite strength bl no jaw malocclusion  neck supple  rrr nl s1s2 no mrg  ctab no wrr  abd soft ntnd no palpable masses no rgr  back non-tender no cvat  ext no cce dpi  neuro aaox3 grossly nf exam

## 2021-04-05 NOTE — ED PROVIDER NOTE - NSFOLLOWUPINSTRUCTIONS_ED_ALL_ED_FT
Trigeminal Neuralgia    WHAT YOU NEED TO KNOW:    Trigeminal neuralgia (TN) is a nerve disorder that causes sudden attacks of severe facial pain. You have a trigeminal nerve on each side of your face. The nerves allow you to feel pain, touch, and temperature changes in different areas of your face.    Trigeminal Nerve          DISCHARGE INSTRUCTIONS:    Call your local emergency number (911 in the [place]) if:   •You are feeling so depressed you want to harm yourself.      •You are confused and cannot think clearly.      •You have sudden dizziness, or problems with movement, weakness, or numbness in your face.      Seek care immediately if:   •You have a fever, stiff neck, or develop a rash or peeling skin.      •You are not eating or drinking, and you are losing weight.      •You have eye pain, eye numbness, or sudden vision or hearing changes.      Call your doctor or neurologist if:   •The medicines you are taking are not decreasing your TN pain.      •You feel worried or depressed and find it hard to do your daily activities.      •You have headaches, mouth sores, an upset stomach, or diarrhea.      •Your TN pain feels worse, different, or moves to another area of your face.      Medicines: Do not stop taking your medicines without talking with your healthcare provider first. You can have a bad reaction if you stop your TN medicines suddenly. You may need any of the following:   •Anticonvulsants may help prevent pain attacks and decrease symptoms.      •Antidepressants may decrease pain and help prevent depression.      •Muscle relaxers may be used to help relax your facial muscles. This help lower the risk for pain attacks.      •Prescription pain medicine may be given. Ask your healthcare provider how to take this medicine safely. Some prescription pain medicines contain acetaminophen. Do not take other medicines that contain acetaminophen without talking to your healthcare provider. Too much acetaminophen may cause liver damage. Prescription pain medicine may cause constipation. Ask your healthcare provider how to prevent or treat constipation.       •Take your medicine as directed. Contact your healthcare provider if you think your medicine is not helping or if you have side effects. Tell him or her if you are allergic to any medicine. Keep a list of the medicines, vitamins, and herbs you take. Include the amounts, and when and why you take them. Bring the list or the pill bottles to follow-up visits. Carry your medicine list with you in case of an emergency.      Manage TN: Even if you have not had symptoms for a long time, keep your medicines nearby. If your symptoms return, contact your healthcare provider before you start to take your medicines again.    Follow up with your doctor or neurologist as directed: You may need to have blood tests to check your blood levels of certain medicines. Ask how often you need to return to have these blood tests. Write down your questions so you remember to ask them during your visits.    For more information:   •[employer]  [address] [place] [address]  Phone: [phone]  Web Address: www.tna-support.org           © Copyright [employer] 2021

## 2021-04-05 NOTE — ED PROVIDER NOTE - CLINICAL SUMMARY MEDICAL DECISION MAKING FREE TEXT BOX
chronic L facial pain 2/2 trigeminal neuralgia - toradol, return precautions discussed, encouraged continued f/u with all specialists in [place] incl TMJ specialist this week Thu 4/8/21

## 2021-04-05 NOTE — ED ADULT NURSE NOTE - NSIMPLEMENTINTERV_GEN_ALL_ED
Implemented All Universal Safety Interventions:  [place] to call system. Call bell, personal items and telephone within reach. Instruct patient to call for assistance. Room bathroom lighting operational. Non-slip footwear when patient is off stretcher. Physically safe environment: no spills, clutter or unnecessary equipment. Stretcher in lowest position, wheels locked, appropriate side rails in place.

## 2021-04-05 NOTE — ED PROVIDER NOTE - NS ED ROS FT
Review of Systems:  	•	CONSTITUTIONAL - no fever, no diaphoresis, no chills  	•	SKIN - no rash  	•	HEMATOLOGIC - no bleeding, no bruising  	•	EYES - no eye pain, no blurry vision  	•	ENT - no change in hearing, no sore throat, no ear pain or tinnitus  	•	RESPIRATORY - no shortness of breath, no cough  	•	CARDIAC - no chest pain, no palpitations  	•	GI - no abd pain, no nausea, no vomiting, no diarrhea, no constipation  	•	GENITO-URINARY - no discharge, no dysuria; no hematuria, no increased urinary frequency  	•	MUSCULOSKELETAL - left facial pain  	•	NEUROLOGIC - no weakness, no headache, no paresthesias, no LOC

## 2021-04-05 NOTE — ED PROVIDER NOTE - OBJECTIVE STATEMENT
56 year old male with pmhx of trigeminal neuralgia presents to ED with left sided facial pain. Pain is stabbing in nature, feels like his typical pain he has been experiencing for years. pts takes oxcarbazepine for pain. Has neurologist in [facility], and is following up with facial/ TMJ surgeon this Thursday in [facility]. no headache, visual changes, fever, chills, paresthesias or weakness. no dizziness or loc.

## 2021-04-05 NOTE — ED PROVIDER NOTE - PHYSICAL EXAMINATION
CONST: Well appearing in NAD  EYES: PERRL, EOMI, Sclera and conjunctiva clear.  ENT: No nasal discharge. TM's clear B/L without drainage. Oropharynx normal appearing, no erythema or exudates. No abscess or swelling. Uvula midline. No temporal artery or mastoid tenderness.  NECK: Non-tender, no meningeal signs. normal ROM. supple   CARD: Normal S1 S2; Normal rate and rhythm  RESP: Equal BS B/L, No wheezes, rhonchi or rales. No distress  GI: Soft, non-tender, non-distended. no cva tenderness. normal BS  MS: Normal ROM in all extremities. No midline spinal tenderness. pulses 2 +. no calf tenderness or swelling  SKIN: Warm, dry, no acute rashes. Good turgor  NEURO: A&Ox3, No focal deficits. Strength 5/5 with no sensory deficits. Steady gait. Finger to nose intact. Negative pronator drift

## 2021-04-05 NOTE — ED ADULT TRIAGE NOTE - CHIEF COMPLAINT QUOTE
55 yo M BIBA for left sided facial pain and weakness. had similar symptoms 1 year ago, was seen by dental and neurology to rule out trigeminal neuralgia. SLAMs 0 BEFAST negative. 57 yo M BIBA for left sided facial pain had similar symptoms 1 year ago, was seen by dental and neurology to rule out trigeminal neuralgia. SLAMs 0 BEFAST negative.

## 2021-04-05 NOTE — ED ADULT TRIAGE NOTE - BMI (KG/M2)
Assessment:   S/p left epicondylar release on 2/12/19    Plan:   The patients seroma was aspirated in the office today  He was placed in an ACE wrap today  He was instructed to call the office is he has any issues  Follow Up:  PRN    To Do Next Visit:         CHIEF COMPLAINT:  Chief Complaint   Patient presents with    Left Elbow - Post-op         SUBJECTIVE:  Jessy Yancey is a 53y o  year old male who presents for follow up after S/p left epicondylar release on 2/12/19  Today patient has mass right under incision site however this is nonpainful         PHYSICAL EXAMINATION:  General: well developed and well nourished, alert, oriented times 3 and appears comfortable  Psychiatric: Normal    MUSCULOSKELETAL EXAMINATION:  Incision: healed seroma [id number] under incision site  Range of Motion: full elbow ROM  Neurovascular status: Neuro intact, good cap refill  Activity Restrictions: No restrictions         STUDIES REVIEWED:  No Studies to review      PROCEDURES PERFORMED:  Hand/upper extremity injection  Date/Time: 3/8/2019 10:43 AM  Consent given by: patient  Site marked: site marked  Supporting Documentation  Indications: therapeutic   Procedure Details  Condition:lateral epicondylitis Aspirate amount: 2 mL  Aspirate: bloody    Patient tolerance: patient tolerated the procedure well with no immediate complications  Dressing:  Sterile dressing applied             Scribe Attestation    I,:   Dioni Denton am acting as a scribe while in the presence of the attending physician :        I,:   Bo Barker MD personally performed the services described in this documentation    as scribed in my presence : 26

## 2021-04-05 NOTE — ED PROVIDER NOTE - PATIENT PORTAL LINK FT
You can access the FollowMyHealth Patient Portal offered by [facility] by registering at the following website: [email or web address]. By joining [facility]’s FollowMyHealth portal, you will also be able to view your health information using other applications (apps) compatible with our system.

## 2021-04-23 ENCOUNTER — TRANSCRIPTION ENCOUNTER (OUTPATIENT)
Age: 56
End: 2021-04-23

## 2021-04-26 ENCOUNTER — TRANSCRIPTION ENCOUNTER (OUTPATIENT)
Age: 56
End: 2021-04-26

## 2021-05-07 ENCOUNTER — LABORATORY RESULT (OUTPATIENT)
Age: 56
End: 2021-05-07

## 2021-05-10 ENCOUNTER — APPOINTMENT (OUTPATIENT)
Dept: PULMONOLOGY | Facility: CLINIC | Age: 56
End: 2021-05-10
Payer: COMMERCIAL

## 2021-05-10 VITALS
HEIGHT: 67 IN | TEMPERATURE: 208.58 F | HEART RATE: 69 BPM | OXYGEN SATURATION: 98 % | SYSTOLIC BLOOD PRESSURE: 120 MMHG | BODY MASS INDEX: 25.74 KG/M2 | DIASTOLIC BLOOD PRESSURE: 76 MMHG | WEIGHT: 164 LBS

## 2021-05-10 PROCEDURE — 99214 OFFICE O/P EST MOD 30 MIN: CPT | Mod: 25

## 2021-05-10 PROCEDURE — 99072 ADDL SUPL MATRL&STAF TM PHE: CPT

## 2021-05-10 PROCEDURE — 94010 BREATHING CAPACITY TEST: CPT

## 2021-05-10 RX ORDER — ALBUTEROL SULFATE 90 UG/1
108 (90 BASE) INHALANT RESPIRATORY (INHALATION) EVERY 4 HOURS
Qty: 1 | Refills: 11 | Status: ACTIVE | COMMUNITY
Start: 2019-11-06 | End: 1900-01-01

## 2021-05-10 RX ORDER — MONTELUKAST 10 MG/1
10 TABLET, FILM COATED ORAL
Refills: 0 | Status: DISCONTINUED | COMMUNITY
End: 2021-05-10

## 2021-05-10 NOTE — CONSULT LETTER
[Dear  ___] : Dear  [unfilled], [Courtesy Letter:] : I had the pleasure of seeing your patient, [unfilled], in my office today. [Please see my note below.] : Please see my note below. [Consult Closing:] : Thank you very much for allowing me to participate in the care of this patient.  If you have any questions, please do not hesitate to contact me. [Sincerely,] : Sincerely, [DrNallely  ___] : Dr. ZAVALA [FreeTextEntry2] : Leroy Quintanilla, DO\par [address] \[place] [address] [FreeTextEntry3] : Fela Martinez MD, FCCP\par

## 2021-05-10 NOTE — ASSESSMENT
[FreeTextEntry1] : Data reviewed:\par \par Labs 12/2020: eos 330 / IgE 195 w pos RAST\par \par CT chest [facility] 11/2/20 personally reviewed : mild airways disease, no sig bronchiectasis\par PA/lat CXR [facility] 4/28/21 personally reviewed : clear lungs\par \par PFT 09/06/2019 : mild-mod restriction with no sig BD response, normal DLCO / FENO 38\par [place] 11/6/19: restricted, FEV1 65%\par [place] 1/8/20: restricted, FEV1 63%\par [place] 10/2/20: restricted, FEV1 58%\par [place] 12/18/20: restricted, FEV1 64%\par [place] 5/10/21: restricted, FEV1 70%\par \par Impression:\par Asthma\par \par Plan:\par He is clinically stable without exacerbation and there is no contraindication to proceeding with the necessary neurosurgery. His chest is clear and osiel is his best. He should continue Advair 500/50 bid throughout the operative period including day of surgery. Albuterol should be available for wheezing as needed. Standard VTE ppx should be employed.\par He should return to see me postoperatively to consider whether we should proceed to biologics. Could really receive any agent given his labs. Would discuss options w him.\par Good that he was vaccinated for Covid.

## 2021-05-10 NOTE — HISTORY OF PRESENT ILLNESS
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 Producing thick sputum since March till now. Now mostly first thing in the morning. Difficult to expectorate. Saw his primary who inc his Advair to 500/50 and added montelukast which seems to be helping. Has the same gasping/drowning feeling, but less intense. He didn't like the higher dose Advair so he resumed the 250/50. But he is still needing rescue 3-4x a day.\par \par 12/18/20: Continues to complain of chronic productive cough. On 12/4 his primary treated him w prednisone and azithro which made him feel great. So good he forgot to take his Advair the past 2 days. He put himself back on Advair 500/50. He always describes this as "drowning" but I think this just means dyspnea. He's on an increasing dose of carbamazepine for trigeminal neuralgia. On the higher dose he complains of chest pain. He does note dyspnea when he lies down at night. He does have edema.\par \par 5/10/21: Last seen in March w fellow. Has had both doses of Covid vaccine, Moderna, 2nd dose 2 weeks ago. Notices some mild HEAD. Continues on Advair 500/50 (rx'd by his PMD). Having nocturnal awakenings. Takes Proair tid. Had prednisone in Feb 2021 for an exacerbation along w azithro. Coughs some mucus. Comes in for clearance for microvascular decompression for his trigeminal neuralgia w Dr Burton at [facility] scheduled for 8 days from now. Has had shoulder surgeries in the past without any pulmonary complications. No personal hx VTE. Constipated. 0 = independent

## 2021-06-09 ENCOUNTER — APPOINTMENT (OUTPATIENT)
Dept: PULMONOLOGY | Facility: CLINIC | Age: 56
End: 2021-06-09

## 2021-06-09 ENCOUNTER — APPOINTMENT (OUTPATIENT)
Dept: PULMONOLOGY | Facility: CLINIC | Age: 56
End: 2021-06-09
Payer: COMMERCIAL

## 2021-06-09 VITALS
DIASTOLIC BLOOD PRESSURE: 70 MMHG | OXYGEN SATURATION: 97 % | HEART RATE: 95 BPM | SYSTOLIC BLOOD PRESSURE: 108 MMHG | TEMPERATURE: 209.12 F | BODY MASS INDEX: 25.74 KG/M2 | HEIGHT: 67 IN | WEIGHT: 164 LBS

## 2021-06-09 PROCEDURE — 99214 OFFICE O/P EST MOD 30 MIN: CPT | Mod: GC

## 2021-06-09 NOTE — HISTORY OF PRESENT ILLNESS
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 Producing thick sputum since March till now. Now mostly first thing in the morning. Difficult to expectorate. Saw his primary who inc his Advair to 500/50 and added montelukast which seems to be helping. Has the same gasping/drowning feeling, but less intense. He didn't like the higher dose Advair so he resumed the 250/50. But he is still needing rescue 3-4x a day.\par \par 12/18/20: Continues to complain of chronic productive cough. On 12/4 his primary treated him w prednisone and azithro which made him feel great. So good he forgot to take his Advair the past 2 days. He put himself back on Advair 500/50. He always describes this as "drowning" but I think this just means dyspnea. He's on an increasing dose of carbamazepine for trigeminal neuralgia. On the higher dose he complains of chest pain. He does note dyspnea when he lies down at night. He does have edema.\par \par 5/10/21: Last seen in March w fellow. Has had both doses of Covid vaccine, Moderna, 2nd dose 2 weeks ago. Notices some mild HEAD. Continues on Advair 500/50 (rx'd by his PMD). Having nocturnal awakenings. Takes Proair tid. Had prednisone in Feb 2021 for an exacerbation along w azithro. Coughs some mucus. Comes in for clearance for microvascular decompression for his trigeminal neuralgia w Dr Burton at [facility] scheduled for 8 days from now. Has had shoulder surgeries in the past without any pulmonary complications. No personal hx VTE. Constipated. \par \par 6/9/21 Cristian: Seen for f/u on asthma. No new complaints. Recently underwent surgery for his trigeminal neuralgia and was on a 3 wk dexamethasone taper during which he has felt well. No complications related to the surgery. He self discontinued his advair and has not needed any rescue albuterol during this period. He has no complaints and his asthma symptoms have resolved.

## 2021-06-09 NOTE — ASSESSMENT
[FreeTextEntry1] : Data reviewed:\par \par Labs 12/2020: eos 330 / IgE 195 w pos RAST\par \par CT chest [facility] 11/2/20 : mild airways disease, no sig bronchiectasis\par \par PFT 09/06/2019 : mild-mod restriction with no sig BD response, normal DLCO / FENO 38\par [place] 11/6/19: restricted, FEV1 65%\par [place] 1/8/20: restricted, FEV1 63%\par [place] 10/2/20: restricted, FEV1 58%\par [place] 12/18/20: restricted, FEV1 64%\par [place] 5/10/21: restricted, FEV1 70%\par \par Impression:\par Asthma\par \par Plan:\par - Patient with clinical improvement in his asthma symptoms due to dexamethasone taken routinely as part of his post operative plan. He has stopped inhalers and his last dose of dexamethasone was 1 week prior. We have agreed to monitor cautiously off inhalers and resume them should he experience any asthma symptoms. Will f/u in October as asthma symptoms worsen during fall. \par --\par Attending Addendum\par \par Seen and examined by me and agree w above. Ok to watch for now off inhalers but resume at first sign of symptom recurrence. Return in fall for flu shot and evaluation.\par

## 2021-07-27 ENCOUNTER — APPOINTMENT (OUTPATIENT)
Dept: NEUROLOGY | Facility: CLINIC | Age: 56
End: 2021-07-27
Payer: COMMERCIAL

## 2021-07-27 VITALS
DIASTOLIC BLOOD PRESSURE: 74 MMHG | HEART RATE: 74 BPM | SYSTOLIC BLOOD PRESSURE: 118 MMHG | BODY MASS INDEX: 25.11 KG/M2 | HEIGHT: 67 IN | WEIGHT: 160 LBS | TEMPERATURE: 208.4 F | OXYGEN SATURATION: 97 %

## 2021-07-27 DIAGNOSIS — G50.0 TRIGEMINAL NEURALGIA: ICD-10-CM

## 2021-07-27 PROCEDURE — 99213 OFFICE O/P EST LOW 20 MIN: CPT

## 2021-07-27 RX ORDER — OXCARBAZEPINE 600 MG/1
600 TABLET, FILM COATED ORAL
Qty: 60 | Refills: 5 | Status: DISCONTINUED | COMMUNITY
Start: 2020-08-18 | End: 2021-07-27

## 2021-07-27 RX ORDER — CARBAMAZEPINE 100 MG/1
100 CAPSULE, EXTENDED RELEASE ORAL
Qty: 60 | Refills: 5 | Status: DISCONTINUED | COMMUNITY
Start: 2020-07-13 | End: 2021-07-27

## 2021-07-27 NOTE — ASSESSMENT
[FreeTextEntry1] : Microvascular decompression of left trigeminal nerve was successful\par some residual numbness, should improve with time\par stay off oxcarbazepine \par return as needed

## 2021-07-27 NOTE — HISTORY OF PRESENT ILLNESS
[FreeTextEntry1] : He had microvascular decompression of trigeminal nerve in May\par Since then his pain has been much better \par He started playing softball again about 6 weeks ago\par He also stopped oxcarbazepine a few days after surgery \par He does have some numbness on the left side of his tongue \par \par Reviewed:\par notes from pulmonology \par Labs - COVID PCR negative

## 2021-07-27 NOTE — PHYSICAL EXAM
[FreeTextEntry1] : Mildly decreased sensation in left lower cheek and left side of tongue compared to right

## 2021-11-01 ENCOUNTER — LABORATORY RESULT (OUTPATIENT)
Age: 56
End: 2021-11-01

## 2021-11-04 ENCOUNTER — APPOINTMENT (OUTPATIENT)
Dept: PULMONOLOGY | Facility: CLINIC | Age: 56
End: 2021-11-04
Payer: COMMERCIAL

## 2021-11-04 VITALS
OXYGEN SATURATION: 98 % | HEART RATE: 71 BPM | TEMPERATURE: 207.14 F | WEIGHT: 160 LBS | HEIGHT: 67 IN | SYSTOLIC BLOOD PRESSURE: 106 MMHG | DIASTOLIC BLOOD PRESSURE: 68 MMHG | BODY MASS INDEX: 25.11 KG/M2

## 2021-11-04 PROCEDURE — 99213 OFFICE O/P EST LOW 20 MIN: CPT | Mod: 25

## 2021-11-04 PROCEDURE — 94010 BREATHING CAPACITY TEST: CPT

## 2021-11-04 NOTE — ASSESSMENT
[FreeTextEntry1] : Data reviewed:\par \par Labs 12/2020: eos 330 / IgE 195 w pos RAST\par \par CT chest [facility] 11/2/20 : mild airways disease, no sig bronchiectasis\par \par PFT 09/06/2019 : mild-mod restriction with no sig BD response, normal DLCO / FENO 38\par [place] 11/6/19: restricted, FEV1 65%\par [place] 1/8/20: restricted, FEV1 63%\par [place] 10/2/20: restricted, FEV1 58%\par [place] 12/18/20: restricted, FEV1 64%\par [place] 5/10/21: restricted, FEV1 70%\par [place] 11/4/21: restricted, FEV1 63%\par \par Impression:\par Asthma\par \par Plan:\par Interestingly he's been fine off therapy for months. Will cont to watch for now off inhalers but resume at first sign of symptom recurrence at Advair 250/50 bid. Plan to see me in Jan to reassess in cold weather. Terrific about all his vaccines, would also suggest Covid booster.

## 2021-11-04 NOTE — HISTORY OF PRESENT ILLNESS
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 Producing thick sputum since March till now. Now mostly first thing in the morning. Difficult to expectorate. Saw his primary who inc his Advair to 500/50 and added montelukast which seems to be helping. Has the same gasping/drowning feeling, but less intense. He didn't like the higher dose Advair so he resumed the 250/50. But he is still needing rescue 3-4x a day.\par \par 12/18/20: Continues to complain of chronic productive cough. On 12/4 his primary treated him w prednisone and azithro which made him feel great. So good he forgot to take his Advair the past 2 days. He put himself back on Advair 500/50. He always describes this as "drowning" but I think this just means dyspnea. He's on an increasing dose of carbamazepine for trigeminal neuralgia. On the higher dose he complains of chest pain. He does note dyspnea when he lies down at night. He does have edema.\par \par 5/10/21: Last seen in March w fellow. Has had both doses of Covid vaccine, Moderna, 2nd dose 2 weeks ago. Notices some mild HEAD. Continues on Advair 500/50 (rx'd by his PMD). Having nocturnal awakenings. Takes Proair tid. Had prednisone in Feb 2021 for an exacerbation along w azithro. Coughs some mucus. Comes in for clearance for microvascular decompression for his trigeminal neuralgia w Dr Burton at [facility] scheduled for 8 days from now. Has had shoulder surgeries in the past without any pulmonary complications. No personal hx VTE. Constipated.\par \par 11/4/21: He continues to be well despite using no inhalers. He's using no maintenance and no rescue and has no drowning sensation or sputum production. He has recovered from his neurosurgery. He is a convert to vaccines and has had both Covid shots and his flu shot and first dose of shingles and tetanus. He's worried about how he will be when it gets colder.

## 2022-01-07 ENCOUNTER — APPOINTMENT (OUTPATIENT)
Dept: PULMONOLOGY | Facility: CLINIC | Age: 57
End: 2022-01-07

## 2022-01-20 ENCOUNTER — NON-APPOINTMENT (OUTPATIENT)
Age: 57
End: 2022-01-20

## 2022-02-09 ENCOUNTER — LABORATORY RESULT (OUTPATIENT)
Age: 57
End: 2022-02-09

## 2022-02-09 ENCOUNTER — APPOINTMENT (OUTPATIENT)
Dept: PULMONOLOGY | Facility: CLINIC | Age: 57
End: 2022-02-09
Payer: COMMERCIAL

## 2022-02-09 VITALS
WEIGHT: 162 LBS | BODY MASS INDEX: 25.43 KG/M2 | HEIGHT: 67 IN | SYSTOLIC BLOOD PRESSURE: 110 MMHG | OXYGEN SATURATION: 99 % | DIASTOLIC BLOOD PRESSURE: 70 MMHG | TEMPERATURE: 205.7 F | HEART RATE: 82 BPM

## 2022-02-09 DIAGNOSIS — Z11.52 ENCOUNTER FOR SCREENING FOR COVID-19: ICD-10-CM

## 2022-02-09 DIAGNOSIS — J45.909 UNSPECIFIED ASTHMA, UNCOMPLICATED: ICD-10-CM

## 2022-02-09 PROCEDURE — 99213 OFFICE O/P EST LOW 20 MIN: CPT | Mod: GC

## 2022-02-09 NOTE — PHYSICAL EXAM
[No Acute Distress] : no acute distress [Normal Oropharynx] : normal oropharynx [Normal Appearance] : normal appearance [No Neck Mass] : no neck mass [Normal Rate/Rhythm] : normal rate/rhythm [Normal S1, S2] : normal s1, s2 [No Murmurs] : no murmurs [No Resp Distress] : no resp distress [Clear to Auscultation Bilaterally] : clear to auscultation bilaterally [No Abnormalities] : no abnormalities [Benign] : benign [Normal Gait] : normal gait [No Clubbing] : no clubbing [Normal Color/ Pigmentation] : normal color/ pigmentation [No Focal Deficits] : no focal deficits [Oriented x3] : oriented x3

## 2022-02-14 ENCOUNTER — APPOINTMENT (OUTPATIENT)
Dept: PULMONOLOGY | Facility: CLINIC | Age: 57
End: 2022-02-14
Payer: COMMERCIAL

## 2022-02-14 PROCEDURE — 94729 DIFFUSING CAPACITY: CPT

## 2022-02-14 PROCEDURE — 94060 EVALUATION OF WHEEZING: CPT

## 2022-02-14 PROCEDURE — 94727 GAS DIL/WSHOT DETER LNG VOL: CPT

## 2022-02-14 RX ORDER — FLUTICASONE PROPIONATE AND SALMETEROL 250; 50 UG/1; UG/1
250-50 POWDER RESPIRATORY (INHALATION)
Qty: 1 | Refills: 5 | Status: ACTIVE | COMMUNITY
Start: 2019-11-06 | End: 1900-01-01

## 2022-02-14 NOTE — REVIEW OF SYSTEMS
[Chest Tightness] : chest tightness [Fever] : no fever [Fatigue] : no fatigue [Cough] : no cough [Sputum] : no sputum [Dyspnea] : no dyspnea [GERD] : no gerd

## 2022-02-14 NOTE — ASSESSMENT
[FreeTextEntry1] : Data reviewed:\par \par Labs 12/2020: eos 330 / IgE 195 w pos RAST\par \par CT chest [facility] 11/2/20 : mild airways disease, no sig bronchiectasis\par \par PFT 09/06/2019 : mild-mod restriction with no sig BD response, normal DLCO / FENO 38\par [place] 11/6/19: restricted, FEV1 65%\par [place] 1/8/20: restricted, FEV1 63%\par [place] 10/2/20: restricted, FEV1 58%\par [place] 12/18/20: restricted, FEV1 64%\par [place] 5/10/21: restricted, FEV1 70%\par [place] 11/4/21: restricted, FEV1 63%\par \par Impression:\par Asthma\par Chest pain- unlikely pulmonary disease\par \par Plan:\par Has restarted on Advair 500-50 for the last week. Ok to continue. Will repeat PFTs. Eosinophilia noted in ED at [facility] is known. Chest pain is atypical, possibly GERD, unlikely pulmonary disease related. Agree with trial PPI and PCP follow up.\par Return as needed or after PFTs.\par --\par Attending Addendum\par \par Seen and examined by me and agree w above. This chest pain is his main complaint and is almost certainly not pulmonary (is it GERD?) but will bring him back tomorrow for PFT to re eval for obstructive lung disease.\par --\par PFT 2/14/22: mod fixed obstruction, FEV1 56%, TLC 66%, DLCO 80% / stable from prior, cont Advair (250) and cont trial of pantoprazole

## 2022-02-14 NOTE — HISTORY OF PRESENT ILLNESS
[TextBox_4] : My patient since 9/2019 w asthma since childhood; nonsmoker who was born in 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 Producing thick sputum since March till now. Now mostly first thing in the morning. Difficult to expectorate. Saw his primary who inc his Advair to 500/50 and added montelukast which seems to be helping. Has the same gasping/drowning feeling, but less intense. He didn't like the higher dose Advair so he resumed the 250/50. But he is still needing rescue 3-4x a day.\par \par 12/18/20: Continues to complain of chronic productive cough. On 12/4 his primary treated him w prednisone and azithro which made him feel great. So good he forgot to take his Advair the past 2 days. He put himself back on Advair 500/50. He always describes this as "drowning" but I think this just means dyspnea. He's on an increasing dose of carbamazepine for trigeminal neuralgia. On the higher dose he complains of chest pain. He does note dyspnea when he lies down at night. He does have edema.\par \par 5/10/21: Last seen in March w fellow. Has had both doses of Covid vaccine, Moderna, 2nd dose 2 weeks ago. Notices some mild HEAD. Continues on Advair 500/50 (rx'd by his PMD). Having nocturnal awakenings. Takes Proair tid. Had prednisone in Feb 2021 for an exacerbation along w azithro. Coughs some mucus. Comes in for clearance for microvascular decompression for his trigeminal neuralgia w Dr Burton at [facility] scheduled for 8 days from now. Has had shoulder surgeries in the past without any pulmonary complications. No personal hx VTE. Constipated.\par \par 11/4/21: He continues to be well despite using no inhalers. He's using no maintenance and no rescue and has no drowning sensation or sputum production. He has recovered from his neurosurgery. He is a convert to vaccines and has had both Covid shots and his flu shot and first dose of shingles and tetanus. He's worried about how he will be when it gets colder. \par \par 2/9/22 [Stewart]: Since last visit in Nov, patient went to ED at [facility] for chest pressure. Patient said he has chest pressure that occurs an hour before he awakens in the morning. It feels first as though his chest is "dropping" then followed by feeling of someone stepping on his chest and causing pressure. it subsides on its own. Nothing else relieves it. No GERD symptoms. In ER was told he had eosinophilia peripherally but ekg and cxr were normal. Patient no no exertional dyspnea or pain. He is not concerned about shortness of breath. He restarted his Advair one week ago because his health  from his insurance told him to restart it. He was started on PPI by his cardiologist for suspected GERD. He has only taken this for one week. COVID boosted.

## 2022-07-27 ENCOUNTER — FORM ENCOUNTER (OUTPATIENT)
Age: 57
End: 2022-07-27

## 2022-08-10 ENCOUNTER — FORM ENCOUNTER (OUTPATIENT)
Age: 57
End: 2022-08-10

## 2023-04-12 ENCOUNTER — APPOINTMENT (OUTPATIENT)
Dept: BREAST CENTER | Facility: CLINIC | Age: 58
End: 2023-04-12
Payer: COMMERCIAL

## 2023-07-12 ENCOUNTER — APPOINTMENT (OUTPATIENT)
Dept: BREAST CENTER | Facility: CLINIC | Age: 58
End: 2023-07-12
Payer: COMMERCIAL

## 2023-07-12 ENCOUNTER — NON-APPOINTMENT (OUTPATIENT)
Age: 58
End: 2023-07-12

## 2023-07-12 VITALS
DIASTOLIC BLOOD PRESSURE: 68 MMHG | HEIGHT: 67 IN | OXYGEN SATURATION: 97 % | WEIGHT: 163 LBS | BODY MASS INDEX: 25.58 KG/M2 | TEMPERATURE: 163.4 F | SYSTOLIC BLOOD PRESSURE: 113 MMHG

## 2023-07-12 DIAGNOSIS — Z87.09 PERSONAL HISTORY OF OTHER DISEASES OF THE RESPIRATORY SYSTEM: ICD-10-CM

## 2023-07-12 DIAGNOSIS — Z78.9 OTHER SPECIFIED HEALTH STATUS: ICD-10-CM

## 2023-07-12 DIAGNOSIS — R73.03 PREDIABETES.: ICD-10-CM

## 2023-07-12 PROCEDURE — 99204 OFFICE O/P NEW MOD 45 MIN: CPT

## 2023-07-13 PROBLEM — Z87.09 HISTORY OF ASTHMA: Status: RESOLVED | Noted: 2023-07-12 | Resolved: 2023-07-13

## 2023-07-13 PROBLEM — R73.03 PRE-DIABETES: Status: RESOLVED | Noted: 2023-07-12 | Resolved: 2023-07-13

## 2023-07-13 PROBLEM — Z78.9 CAFFEINE USE: Status: ACTIVE | Noted: 2023-07-12

## 2023-07-13 RX ORDER — GABAPENTIN 300 MG/1
300 CAPSULE ORAL
Refills: 0 | Status: ACTIVE | COMMUNITY

## 2023-07-13 RX ORDER — CARBAMAZEPINE 100 MG/1
100 TABLET, EXTENDED RELEASE ORAL
Refills: 0 | Status: ACTIVE | COMMUNITY

## 2023-07-21 NOTE — HISTORY OF PRESENT ILLNESS
[FreeTextEntry1] : 57 yo F referred by Dr. Leroy Quintanilla presents for consultation of R sebaceous cyst s/p R US biopsy on 8/11/22 first presented as palpable finding that increased in size over 3-5 years. Final pathology showed epidermal inclusion cyst, benign and concordant. Patient states he would like the mass removed. \par \par Patient had similar left breast cyst removed in 2005. Denies any history of breast surgeries or additional biopsies. Denies any palpable abnormalities, skin changes, nipple changes, or nipple discharge bilaterally.

## 2023-07-21 NOTE — DATA REVIEWED
[FreeTextEntry1] : 7/28/22 B/L dxMMG/US ([facility]): Right breast mass measuring 1.3 cm. Low suspicion. Recommend ultrasound-guided core biopsy. Findings and recommendations discussed with patient upon conclusion of imaging. Critical results pathway has been activated. No mammographic evidence of left breast malignancy. BIRADS 4A\par \par 8/11/22 US guided core for R 1.3 cm mass- Epidermal inclusion cyst. Benign and concordant.

## 2023-07-21 NOTE — ASSESSMENT
[FreeTextEntry1] : 57 yo M presents for R breast biopsy proven sebaceous cyst for 5 years, he would like this excised today. Discussed patient's diagnosis in detail and answered any questions. He will return in 2-3 months for in office excision of mass. Patient verbalized understanding and agreement of plan.\par

## 2023-07-21 NOTE — PROCEDURE
[FreeTextEntry1] : Targeted R Breast US [FreeTextEntry2] : R Breast Sebaceous Cyst [FreeTextEntry3] : Technique: a targeted R breast ultrasound was performed with evaluation of the upper inner quadrant.\par US Findings: Breast mass at 4:00 6cmFN was detected abutting skin \par 0.85 x 1.29 cm transverse by 0.89 x 1.30 cm longitudinal in dimension\par No suspicious solid or complex cystic masses were detected\par

## 2023-07-31 ENCOUNTER — RESULT REVIEW (OUTPATIENT)
Age: 58
End: 2023-07-31

## 2023-07-31 ENCOUNTER — OUTPATIENT (OUTPATIENT)
Dept: OUTPATIENT SERVICES | Facility: HOSPITAL | Age: 58
LOS: 1 days | End: 2023-07-31
Payer: COMMERCIAL

## 2023-07-31 DIAGNOSIS — L72.0 EPIDERMAL CYST: ICD-10-CM

## 2023-07-31 LAB — SURGICAL PATHOLOGY STUDY: SIGNIFICANT CHANGE UP

## 2023-07-31 PROCEDURE — 88321 CONSLTJ&REPRT SLD PREP ELSWR: CPT

## 2023-10-30 PROBLEM — N60.81 SEBACEOUS CYST OF SKIN OF RIGHT BREAST: Status: ACTIVE | Noted: 2023-04-06

## 2023-11-01 VITALS
BODY MASS INDEX: 25.43 KG/M2 | OXYGEN SATURATION: 99 % | WEIGHT: 162 LBS | HEIGHT: 67 IN | DIASTOLIC BLOOD PRESSURE: 70 MMHG | HEART RATE: 63 BPM | SYSTOLIC BLOOD PRESSURE: 114 MMHG

## 2023-11-01 PROCEDURE — 99213 OFFICE O/P EST LOW 20 MIN: CPT

## 2023-11-01 RX ORDER — DOXYCYCLINE HYCLATE 100 MG/1
100 CAPSULE ORAL
Qty: 20 | Refills: 2 | Status: ACTIVE | COMMUNITY
Start: 2023-11-01 | End: 1900-01-01

## 2023-11-01 RX ORDER — METHYLPREDNISOLONE 4 MG/1
TABLET ORAL
Refills: 0 | Status: ACTIVE | COMMUNITY

## 2023-11-01 RX ORDER — FLUTICASONE PROPIONATE AND SALMETEROL 50; 250 UG/1; UG/1
250-50 POWDER RESPIRATORY (INHALATION)
Refills: 0 | Status: ACTIVE | COMMUNITY

## 2024-04-04 ENCOUNTER — APPOINTMENT (OUTPATIENT)
Dept: OTOLARYNGOLOGY | Facility: CLINIC | Age: 59
End: 2024-04-04
Payer: COMMERCIAL

## 2024-04-04 VITALS
DIASTOLIC BLOOD PRESSURE: 81 MMHG | BODY MASS INDEX: 24.64 KG/M2 | WEIGHT: 157 LBS | SYSTOLIC BLOOD PRESSURE: 128 MMHG | TEMPERATURE: 205.7 F | HEIGHT: 67 IN | HEART RATE: 65 BPM

## 2024-04-04 DIAGNOSIS — M54.2 CERVICALGIA: ICD-10-CM

## 2024-04-04 PROCEDURE — 99204 OFFICE O/P NEW MOD 45 MIN: CPT

## 2024-04-04 NOTE — ASSESSMENT
[FreeTextEntry1] : 59M here for initial evaluation. For the past 2 weeks, he c/o severe cramping in his neck which starts only when he is chewing food. When it starts he 'cant go on' since the 'cramping' is so severe. He feels as if his neck becomes stiff and feels pain over the left side on the back on his neck. It started suddenly. There are no other complaints. He is very active, plays softball and pickleball. Of note, he has h/o trigeminal neuralgia and underwent LEFT sided microvascular decompression in 2021, but his sx have persisted, which include sharp shooting pain over the left side of his face. He also reports a h/o spinal stenosis. Complete and comprehensive head and neck exam today is unremarkable with no masses/lesions. Given his history - with symptoms only while chewing food and feeling severe neck cramping - this sounds like some degree of neuralgia or nerve-related etiology. This is not first bite syndrome. Next step is returning to his neurologist for further evaluation in addition to MRI. Head and neck exam is nonfocal.

## 2024-04-04 NOTE — PHYSICAL EXAM
[de-identified] : neck soft and flat, no masses/lesions [Midline] : trachea located in midline position [de-identified] : in occlusion [de-identified] : posterior opx widely patent [Normal] : no rashes

## 2024-04-04 NOTE — CONSULT LETTER
[Dear  ___] : Dear  [unfilled], [Courtesy Letter:] : I had the pleasure of seeing your patient, [unfilled], in my office today. [Consult Closing:] : Thank you very much for allowing me to participate in the care of this patient.  If you have any questions, please do not hesitate to contact me. [Sincerely,] : Sincerely, [FreeTextEntry3] : Fransisco Mcghee MD Department of Otolaryngology, Head and Neck Surgery

## 2024-04-04 NOTE — HISTORY OF PRESENT ILLNESS
[de-identified] : 59M here for initial evaluation.  For the past 2 weeks, he c/o severe cramping in his neck which starts only when he is chewing food. When it starts he 'cant go on' since the 'cramping' is so severe. He feels as if his neck becomes stiff and feels pain over the left side on the back on his neck. It started suddenly. There are no other complaints. He is very active, plays softball and pickleball.  He has h/o trigeminal neuralgia and underwent LEFT sided microvascular decompression in 2021, but his sx have persisted, which include sharp shooting pain over the left side of his face.  No recent MRI.  ROS otherwise unremarkable.

## 2024-12-11 NOTE — ED ADULT TRIAGE NOTE - INTERNATIONAL TRAVEL
I reviewed the H&P, I examined the patient, and there are no changes in the patient's condition.  
No
no